# Patient Record
Sex: FEMALE | Race: BLACK OR AFRICAN AMERICAN | Employment: UNEMPLOYED | URBAN - METROPOLITAN AREA
[De-identification: names, ages, dates, MRNs, and addresses within clinical notes are randomized per-mention and may not be internally consistent; named-entity substitution may affect disease eponyms.]

---

## 2024-02-22 ENCOUNTER — HOSPITAL ENCOUNTER (EMERGENCY)
Age: 24
Discharge: HOME | End: 2024-02-22
Payer: COMMERCIAL

## 2024-02-22 VITALS
WEIGHT: 127 LBS | TEMPERATURE: 99.3 F | RESPIRATION RATE: 14 BRPM | DIASTOLIC BLOOD PRESSURE: 66 MMHG | HEIGHT: 67 IN | OXYGEN SATURATION: 98 % | SYSTOLIC BLOOD PRESSURE: 115 MMHG | HEART RATE: 99 BPM | BODY MASS INDEX: 19.93 KG/M2

## 2024-02-22 DIAGNOSIS — B34.9 VIRAL ILLNESS: Primary | ICD-10-CM

## 2024-02-22 LAB
INFLUENZA A ANTIGEN: NOT DETECTED
INFLUENZA B ANTIGEN: NOT DETECTED
SARS-COV-2 RDRP RESP QL NAA+PROBE: NOT DETECTED
SOURCE: NORMAL

## 2024-02-22 PROCEDURE — 6370000000 HC RX 637 (ALT 250 FOR IP): Performed by: NURSE PRACTITIONER

## 2024-02-22 PROCEDURE — 99283 EMERGENCY DEPT VISIT LOW MDM: CPT

## 2024-02-22 PROCEDURE — 87502 INFLUENZA DNA AMP PROBE: CPT

## 2024-02-22 PROCEDURE — 87635 SARS-COV-2 COVID-19 AMP PRB: CPT

## 2024-02-22 RX ORDER — IBUPROFEN 600 MG/1
600 TABLET ORAL ONCE
Status: COMPLETED | OUTPATIENT
Start: 2024-02-22 | End: 2024-02-22

## 2024-02-22 RX ADMIN — IBUPROFEN 600 MG: 600 TABLET, FILM COATED ORAL at 14:35

## 2024-02-22 ASSESSMENT — ENCOUNTER SYMPTOMS
SORE THROAT: 0
COUGH: 1
NAUSEA: 0
DIARRHEA: 0
ABDOMINAL PAIN: 0

## 2024-02-22 NOTE — DISCHARGE INSTR - COC
Continuity of Care Form    Patient Name: Darryn Hill   :  2000  MRN:  [id number]    Admit date:  2024  Discharge date:  ***    Code Status Order: No Order   Advance Directives:     Admitting Physician:  No admitting provider for patient encounter.  PCP: No primary care provider on file.    Discharging Nurse: ***  Discharging Hospital Unit/Room#: ED04/ED-04  Discharging Unit Phone Number: ***    Emergency Contact:   Extended Emergency Contact Information  Primary Emergency Contact: Ezra Riley  Mobile Phone: [phone]  Relation: Domestic Partner  Preferred language: [place] Creole   needed? Yes    Past Surgical History:  History reviewed. No pertinent surgical history.    Immunization History:     There is no immunization history on file for this patient.    Active Problems:  There is no problem list on file for this patient.      Isolation/Infection:   Isolation            No Isolation          Patient Infection Status       None to display                     Nurse Assessment:  Last Vital Signs: /66   Pulse 99   Temp 99.3 °F (37.4 °C)   Resp 14   Ht 1.702 m (5' 7\")   Wt 57.6 kg (127 lb)   SpO2 98%   BMI 19.89 kg/m²     Last documented pain score (0-10 scale):    Last Weight:   Wt Readings from Last 1 Encounters:   24 57.6 kg (127 lb)     Mental Status:  {IP PT MENTAL STATUS:}    IV Access:  { DORA IV ACCESS:830141146}    Nursing Mobility/ADLs:  Walking   {CHP DME ADLs:007832395}  Transfer  {CHP DME ADLs:230985147}  Bathing  {CHP DME ADLs:111669241}  Dressing  {CHP DME ADLs:476828280}  Toileting  {CHP DME ADLs:071202945}  Feeding  {CHP DME ADLs:290914850}  Med Admin  {CHP DME ADLs:645770617}  Med Delivery   { DORA MED Delivery:452832468}    Wound Care Documentation and Therapy:        Elimination:  Continence:   Bowel: {YES / NO:}  Bladder: {YES / NO:}  Urinary Catheter: {Urinary Catheter:027773667}   Colostomy/Ileostomy/Ileal Conduit: {YES /  treatment of the diagnosis listed and that she requires {Admit to Appropriate Level of Care:14004} for {GREATER/LESS:845851527} 30 days.     Update Admission H&P: {CHP DME Changes in HandP:889582440}    PHYSICIAN SIGNATURE:  {Esignature:484941892}

## 2024-02-22 NOTE — ED PROVIDER NOTES
[facility] EMERGENCY DEPARTMENT  EMERGENCY DEPARTMENT ENCOUNTER      Pt Name: Darryn Hill  MRN: [id number]  Birthdate 2000  Date of evaluation: 2/22/2024  Provider: DELILAH Jacobs CNP  PCP: No primary care provider on file.  Note Started: 2:06 PM EST 2/22/24    I am the Primary Clinician of Record.  IVA. I have evaluated this patient.    CHIEF COMPLAINT       Chief Complaint   Patient presents with    Fever     Fever and headache for the past 3 days.       HISTORY OF PRESENT ILLNESS: 1 or more Elements   Darryn Hill is a 24 y.o. female who presents to the ER with chief complaint of fever and headache for 3 days.  She has associated nasal congestion and productive cough. She took medication the day before yesterday but does not know what it was.      I have reviewed the nursing triage documentation and agree unless otherwise noted.  REVIEW OF SYSTEMS :    Review of Systems   Constitutional:  Positive for fever.   HENT:  Negative for sore throat.    Respiratory:  Positive for cough.    Cardiovascular:  Negative for chest pain.   Gastrointestinal:  Negative for abdominal pain, diarrhea and nausea.   Neurological:  Positive for headaches.     Positives and Pertinent negatives as per HPI.   SURGICAL HISTORY   History reviewed. No pertinent surgical history.    CURRENTMEDICATIONS       Previous Medications    No medications on file       ALLERGIES     Patient has no known allergies.    FAMILYHISTORY     History reviewed. No pertinent family history.     SOCIAL HISTORY       Social History     Tobacco Use    Smoking status: Never     Passive exposure: Never    Smokeless tobacco: Never   Vaping Use    Vaping Use: Never used   Substance Use Topics    Alcohol use: Never    Drug use: Never       SCREENINGS    [place] Coma Scale  Eye Opening: Spontaneous  Best Verbal Response: Oriented  Best Motor Response: Obeys commands  [place] Coma Scale Score:  findings, vital signs and medical chart review. Emergent etiologies considered.     Darryn Hill is an alert and oriented x4, 24 y.o. female. Pt has easy nonlabored respirations.  Vital signs within acceptable parameters.  Patient is afebrile and in no acute distress. Pt hemodynamically stable and neurovascularly intact.    Patient was treated the following medications:  Medications   ibuprofen (ADVIL;MOTRIN) tablet 600 mg (600 mg Oral Given 2/22/24 1435)       Pt well appearing.  Likely vital. Pt has not taken anything for symptoms.  She has been treated for her symptoms.  Influenza and Covid negative.  Pt reports improvement of headache.     CONSULTS: None      Patient’s care significantly limited by social determinants of health including:   Disposition Considerations (tests considered but not done, Shared Decision Making, Pt Expectation of Test or Tx.):   Appropriate for outpatient management. Patient discharge in stable condition.      FINAL IMPRESSION      1. Viral illness          DISPOSITION/PLAN   DISPOSITION Decision To Discharge 02/22/2024 02:20:22 PM      PATIENT REFERRED TO:  No follow-up provider specified.    DISCHARGE MEDICATIONS:  There are no discharge medications for this patient.         (Please note that portions of this note were completed with a voice recognition program.  Efforts were made to edit the dictations but occasionally words are mis-transcribed.)    DELILAH Jacobs CNP (electronically signed)         Maria Alejandra Alamo APRN - CNP  02/26/24 [id number]

## 2024-02-22 NOTE — DISCHARGE INSTRUCTIONS
Your symptoms are consistent with a viral illness.  This is a self-limiting illness that will get better on its own.  Management includes ibuprofen or Tylenol which you can buy at any grocery store, drugstore or gas station.  Taking this medication will help with bodyaches, fever, sore throat excetra.  Be sure to drink plenty of fluids to stay hydrated.  Get plenty of rest.  Viruses are contagious.  Friends or family members may also come down with similar symptoms.  Treatment would be the same for them.  Follow-up with your PCP.  I showed you on your insurance card the name and the number to call.  Return to the emergency department for any worsening signs or symptoms or acute concerns.